# Patient Record
Sex: MALE | Race: WHITE | HISPANIC OR LATINO | Employment: FULL TIME | URBAN - METROPOLITAN AREA
[De-identification: names, ages, dates, MRNs, and addresses within clinical notes are randomized per-mention and may not be internally consistent; named-entity substitution may affect disease eponyms.]

---

## 2024-09-19 ENCOUNTER — LAB VISIT (OUTPATIENT)
Dept: LAB | Facility: HOSPITAL | Age: 19
End: 2024-09-19
Attending: FAMILY MEDICINE

## 2024-09-19 ENCOUNTER — OFFICE VISIT (OUTPATIENT)
Dept: FAMILY MEDICINE | Facility: CLINIC | Age: 19
End: 2024-09-19
Payer: COMMERCIAL

## 2024-09-19 VITALS
TEMPERATURE: 98 F | HEART RATE: 89 BPM | WEIGHT: 192.25 LBS | BODY MASS INDEX: 26.04 KG/M2 | DIASTOLIC BLOOD PRESSURE: 80 MMHG | OXYGEN SATURATION: 97 % | HEIGHT: 72 IN | SYSTOLIC BLOOD PRESSURE: 128 MMHG

## 2024-09-19 DIAGNOSIS — Z13.220 ENCOUNTER FOR LIPID SCREENING FOR CARDIOVASCULAR DISEASE: ICD-10-CM

## 2024-09-19 DIAGNOSIS — Z11.4 ENCOUNTER FOR SCREENING FOR HIV: ICD-10-CM

## 2024-09-19 DIAGNOSIS — Z13.6 ENCOUNTER FOR LIPID SCREENING FOR CARDIOVASCULAR DISEASE: ICD-10-CM

## 2024-09-19 DIAGNOSIS — Z11.59 NEED FOR HEPATITIS C SCREENING TEST: ICD-10-CM

## 2024-09-19 DIAGNOSIS — Z00.00 GENERAL MEDICAL EXAM: Primary | ICD-10-CM

## 2024-09-19 DIAGNOSIS — Z11.3 SCREENING EXAMINATION FOR STI: ICD-10-CM

## 2024-09-19 DIAGNOSIS — Z00.00 GENERAL MEDICAL EXAM: ICD-10-CM

## 2024-09-19 LAB
ALBUMIN SERPL BCP-MCNC: 4.6 G/DL (ref 3.5–5.2)
ALP SERPL-CCNC: 63 U/L (ref 55–135)
ALT SERPL W/O P-5'-P-CCNC: 21 U/L (ref 10–44)
ANION GAP SERPL CALC-SCNC: 8 MMOL/L (ref 8–16)
AST SERPL-CCNC: 25 U/L (ref 10–40)
BASOPHILS # BLD AUTO: 0.08 K/UL (ref 0–0.2)
BASOPHILS NFR BLD: 1.1 % (ref 0–1.9)
BILIRUB SERPL-MCNC: 1.1 MG/DL (ref 0.1–1)
BUN SERPL-MCNC: 10 MG/DL (ref 6–20)
CALCIUM SERPL-MCNC: 9.9 MG/DL (ref 8.7–10.5)
CHLORIDE SERPL-SCNC: 104 MMOL/L (ref 95–110)
CHOLEST SERPL-MCNC: 148 MG/DL (ref 120–199)
CHOLEST/HDLC SERPL: 3.9 {RATIO} (ref 2–5)
CO2 SERPL-SCNC: 25 MMOL/L (ref 23–29)
CREAT SERPL-MCNC: 0.9 MG/DL (ref 0.5–1.4)
DIFFERENTIAL METHOD BLD: ABNORMAL
EOSINOPHIL # BLD AUTO: 0.2 K/UL (ref 0–0.5)
EOSINOPHIL NFR BLD: 2.3 % (ref 0–8)
ERYTHROCYTE [DISTWIDTH] IN BLOOD BY AUTOMATED COUNT: 17.6 % (ref 11.5–14.5)
EST. GFR  (NO RACE VARIABLE): >60 ML/MIN/1.73 M^2
GLUCOSE SERPL-MCNC: 67 MG/DL (ref 70–110)
HCT VFR BLD AUTO: 38 % (ref 40–54)
HDLC SERPL-MCNC: 38 MG/DL (ref 40–75)
HDLC SERPL: 25.7 % (ref 20–50)
HGB BLD-MCNC: 11.8 G/DL (ref 14–18)
IMM GRANULOCYTES # BLD AUTO: 0.08 K/UL (ref 0–0.04)
IMM GRANULOCYTES NFR BLD AUTO: 1.1 % (ref 0–0.5)
LDLC SERPL CALC-MCNC: 81.8 MG/DL (ref 63–159)
LYMPHOCYTES # BLD AUTO: 2.7 K/UL (ref 1–4.8)
LYMPHOCYTES NFR BLD: 36.1 % (ref 18–48)
MCH RBC QN AUTO: 19.9 PG (ref 27–31)
MCHC RBC AUTO-ENTMCNC: 31.1 G/DL (ref 32–36)
MCV RBC AUTO: 64 FL (ref 82–98)
MONOCYTES # BLD AUTO: 0.7 K/UL (ref 0.3–1)
MONOCYTES NFR BLD: 9.3 % (ref 4–15)
NEUTROPHILS # BLD AUTO: 3.8 K/UL (ref 1.8–7.7)
NEUTROPHILS NFR BLD: 50.1 % (ref 38–73)
NONHDLC SERPL-MCNC: 110 MG/DL
NRBC BLD-RTO: 0 /100 WBC
PLATELET # BLD AUTO: 217 K/UL (ref 150–450)
PMV BLD AUTO: 11.5 FL (ref 9.2–12.9)
POTASSIUM SERPL-SCNC: 4.2 MMOL/L (ref 3.5–5.1)
PROT SERPL-MCNC: 8 G/DL (ref 6–8.4)
RBC # BLD AUTO: 5.94 M/UL (ref 4.6–6.2)
SODIUM SERPL-SCNC: 137 MMOL/L (ref 136–145)
TRIGL SERPL-MCNC: 141 MG/DL (ref 30–150)
WBC # BLD AUTO: 7.5 K/UL (ref 3.9–12.7)

## 2024-09-19 PROCEDURE — 86593 SYPHILIS TEST NON-TREP QUANT: CPT | Performed by: FAMILY MEDICINE

## 2024-09-19 PROCEDURE — 99385 PREV VISIT NEW AGE 18-39: CPT | Mod: S$PBB,,, | Performed by: FAMILY MEDICINE

## 2024-09-19 PROCEDURE — 80053 COMPREHEN METABOLIC PANEL: CPT | Performed by: FAMILY MEDICINE

## 2024-09-19 PROCEDURE — 99213 OFFICE O/P EST LOW 20 MIN: CPT | Mod: PBBFAC,PN | Performed by: FAMILY MEDICINE

## 2024-09-19 PROCEDURE — 86803 HEPATITIS C AB TEST: CPT | Performed by: FAMILY MEDICINE

## 2024-09-19 PROCEDURE — 99999 PR PBB SHADOW E&M-EST. PATIENT-LVL III: CPT | Mod: PBBFAC,,, | Performed by: FAMILY MEDICINE

## 2024-09-19 PROCEDURE — 36415 COLL VENOUS BLD VENIPUNCTURE: CPT | Mod: PN | Performed by: FAMILY MEDICINE

## 2024-09-19 PROCEDURE — 85025 COMPLETE CBC W/AUTO DIFF WBC: CPT | Performed by: FAMILY MEDICINE

## 2024-09-19 PROCEDURE — 80061 LIPID PANEL: CPT | Performed by: FAMILY MEDICINE

## 2024-09-19 PROCEDURE — 87389 HIV-1 AG W/HIV-1&-2 AB AG IA: CPT | Performed by: FAMILY MEDICINE

## 2024-09-20 LAB
HCV AB SERPL QL IA: NORMAL
HIV 1+2 AB+HIV1 P24 AG SERPL QL IA: NORMAL
TREPONEMA PALLIDUM IGG+IGM AB [PRESENCE] IN SERUM OR PLASMA BY IMMUNOASSAY: NONREACTIVE

## 2024-09-30 NOTE — PROGRESS NOTES
Patient Name: Reno Carrillo    : 2005  MRN: [id number]      Subjective:     Patient ID: Reno is a 19 y.o. male    Chief Complaint:  Annual Exam    19-year-old male presents to [facility] care and for annual health maintenance exam.         Review of Systems   Constitutional:  Negative for unexpected weight change.   HENT:  Negative for ear pain and sore throat.    Eyes:  Negative for visual disturbance.   Respiratory:  Negative for shortness of breath.    Cardiovascular:  Negative for chest pain.   Gastrointestinal:  Negative for abdominal pain and blood in stool.   Endocrine: Negative for cold intolerance and heat intolerance.   Genitourinary:  Negative for dysuria and frequency.   Integumentary:  Negative for rash.   Neurological:  Negative for weakness, numbness and headaches.   Hematological:  Negative for adenopathy.   Psychiatric/Behavioral:  Negative for suicidal ideas.         Objective:   /80 (BP Location: Right arm, Patient Position: Sitting, BP Method: Medium (Manual))   Pulse 89   Temp 97.8 °F (36.6 °C) (Oral)   Ht 6' (1.829 m)   Wt 87.2 kg (192 lb 3.9 oz)   SpO2 97%   BMI 26.07 kg/m²     Physical Exam  Vitals reviewed.   Constitutional:       General: He is not in acute distress.  HENT:      Head: Normocephalic and atraumatic.      Right Ear: Ear canal and external ear normal.      Left Ear: Ear canal and external ear normal.      Nose: Nose normal.      Mouth/Throat:      Mouth: Mucous membranes are moist.      Pharynx: No oropharyngeal exudate or posterior oropharyngeal erythema.   Eyes:      Extraocular Movements: Extraocular movements intact.      Conjunctiva/sclera: Conjunctivae normal.      Pupils: Pupils are equal, round, and reactive to light.   Cardiovascular:      Rate and Rhythm: Normal rate and regular rhythm.      Pulses: Normal pulses.      Heart sounds: Normal heart sounds.   Pulmonary:      Effort: Pulmonary effort is normal. No respiratory distress.      Breath  sounds: No wheezing or rales.   Abdominal:      General: Abdomen is flat. Bowel sounds are normal. There is no distension.      Palpations: Abdomen is soft.      Tenderness: There is no abdominal tenderness. There is no guarding.   Musculoskeletal:      Cervical back: Normal range of motion. No rigidity or tenderness.   Lymphadenopathy:      Cervical: No cervical adenopathy.   Skin:     General: Skin is warm.      Capillary Refill: Capillary refill takes less than 2 seconds.   Neurological:      Mental Status: He is alert and oriented to person, place, and time.      Cranial Nerves: No cranial nerve deficit.      Sensory: No sensory deficit.   Psychiatric:         Mood and Affect: Mood normal.         Behavior: Behavior normal.        Assessment        ICD-10-CM ICD-9-CM   1. General medical exam  Z00.00 V70.9   2. Encounter for lipid screening for cardiovascular disease  Z13.220 V77.91    Z13.6 V81.2   3. Encounter for screening for HIV  Z11.4 V73.89   4. Need for hepatitis C screening test  Z11.59 V73.89   5. Screening examination for STI  Z11.3 V74.5         Plan:     1. General medical exam  -     Comprehensive Metabolic Panel; Future; Expected date: 09/19/2024  -     CBC Auto Differential; Future; Expected date: 09/19/2024  -     Lipid Panel; Future; Expected date: 09/19/2024  -     HIV 1/2 Ag/Ab (4th Gen); Future; Expected date: 09/19/2024  -     Hepatitis C Antibody; Future; Expected date: 09/19/2024  -     C. trachomatis/N. gonorrhoeae by AMP DNA; Future; Expected date: 09/19/2024  -     Treponema Pallidium Antibodies IgG, IgM; Future; Expected date: 09/19/2024  Age appropriate screenings, vaccinations, and recommendations reviewed and discussed.  Appropriate orders placed and previous results reviewed.    2. Encounter for lipid screening for cardiovascular disease  -     Lipid Panel; Future; Expected date: 09/19/2024  Screening lipid panel ordered as per USPSTF guidelines.    3. Encounter for screening for  HIV  -     HIV 1/2 Ag/Ab (4th Gen); Future; Expected date: 09/19/2024  Screen for HIV as per USPSTF guidelines.    4. Need for hepatitis C screening test  -     Hepatitis C Antibody; Future; Expected date: 09/19/2024  Screen for HCV as per USPSTF guidelines.    5. Screening examination for STI  -     HIV 1/2 Ag/Ab (4th Gen); Future; Expected date: 09/19/2024  -     Hepatitis C Antibody; Future; Expected date: 09/19/2024  -     C. trachomatis/N. gonorrhoeae by AMP DNA; Future; Expected date: 09/19/2024  -     Treponema Pallidium Antibodies IgG, IgM; Future; Expected date: 09/19/2024  STI screening ordered         -Sebastien Faria Jr., MD, AAHIVS      This office note has been dictated.  This dictation has been generated using M-Modal Fluency Direct dictation; some phonetic errors may occur.         There are no Patient Instructions on file for this visit.      Follow up in about 1 year (around 9/19/2025) for Annual.   Future Appointments   Date Time Provider Department Center   9/19/2025 10:00 AM Sebastien Faria Jr., MD [facility]

## 2024-10-08 ENCOUNTER — TELEPHONE (OUTPATIENT)
Dept: FAMILY MEDICINE | Facility: CLINIC | Age: 19
End: 2024-10-08
Payer: COMMERCIAL

## 2024-10-08 NOTE — TELEPHONE ENCOUNTER
Appointment scheduled, letter sent out  ----- Message from Sebastien Faria MD sent at 10/6/2024  8:54 PM CDT -----  Please call patient with  schedule virtual visit with me so we can discuss his labs as they show anemia

## 2024-10-18 ENCOUNTER — LAB VISIT (OUTPATIENT)
Dept: LAB | Facility: HOSPITAL | Age: 19
End: 2024-10-18
Attending: FAMILY MEDICINE
Payer: COMMERCIAL

## 2024-10-18 ENCOUNTER — OFFICE VISIT (OUTPATIENT)
Dept: FAMILY MEDICINE | Facility: CLINIC | Age: 19
End: 2024-10-18
Payer: COMMERCIAL

## 2024-10-18 DIAGNOSIS — N32.81 OVERACTIVE BLADDER: ICD-10-CM

## 2024-10-18 DIAGNOSIS — R39.15 URINARY URGENCY: ICD-10-CM

## 2024-10-18 DIAGNOSIS — D50.9 MICROCYTIC ANEMIA: Primary | ICD-10-CM

## 2024-10-18 DIAGNOSIS — D50.9 MICROCYTIC ANEMIA: ICD-10-CM

## 2024-10-18 LAB
BASOPHILS # BLD AUTO: 0.07 K/UL (ref 0–0.2)
BASOPHILS NFR BLD: 1 % (ref 0–1.9)
DIFFERENTIAL METHOD BLD: ABNORMAL
EOSINOPHIL # BLD AUTO: 0.2 K/UL (ref 0–0.5)
EOSINOPHIL NFR BLD: 2.5 % (ref 0–8)
ERYTHROCYTE [DISTWIDTH] IN BLOOD BY AUTOMATED COUNT: 16.5 % (ref 11.5–14.5)
FERRITIN SERPL-MCNC: 193 NG/ML (ref 20–300)
HCT VFR BLD AUTO: 36.1 % (ref 40–54)
HGB BLD-MCNC: 11.4 G/DL (ref 14–18)
IMM GRANULOCYTES # BLD AUTO: 0.06 K/UL (ref 0–0.04)
IMM GRANULOCYTES NFR BLD AUTO: 0.8 % (ref 0–0.5)
IRON SERPL-MCNC: 113 UG/DL (ref 45–160)
LDH SERPL L TO P-CCNC: 146 U/L (ref 110–260)
LYMPHOCYTES # BLD AUTO: 2.6 K/UL (ref 1–4.8)
LYMPHOCYTES NFR BLD: 36.9 % (ref 18–48)
MCH RBC QN AUTO: 19.8 PG (ref 27–31)
MCHC RBC AUTO-ENTMCNC: 31.6 G/DL (ref 32–36)
MCV RBC AUTO: 63 FL (ref 82–98)
MONOCYTES # BLD AUTO: 0.6 K/UL (ref 0.3–1)
MONOCYTES NFR BLD: 8.9 % (ref 4–15)
NEUTROPHILS # BLD AUTO: 3.5 K/UL (ref 1.8–7.7)
NEUTROPHILS NFR BLD: 49.9 % (ref 38–73)
NRBC BLD-RTO: 0 /100 WBC
PLATELET # BLD AUTO: 223 K/UL (ref 150–450)
PMV BLD AUTO: 10.4 FL (ref 9.2–12.9)
RBC # BLD AUTO: 5.75 M/UL (ref 4.6–6.2)
RETICS/RBC NFR AUTO: 2.8 % (ref 0.4–2)
SATURATED IRON: 40 % (ref 20–50)
TOTAL IRON BINDING CAPACITY: 286 UG/DL (ref 250–450)
TRANSFERRIN SERPL-MCNC: 193 MG/DL (ref 200–375)
WBC # BLD AUTO: 7.07 K/UL (ref 3.9–12.7)

## 2024-10-18 PROCEDURE — 83615 LACTATE (LD) (LDH) ENZYME: CPT | Performed by: FAMILY MEDICINE

## 2024-10-18 PROCEDURE — 85025 COMPLETE CBC W/AUTO DIFF WBC: CPT | Performed by: FAMILY MEDICINE

## 2024-10-18 PROCEDURE — 1160F RVW MEDS BY RX/DR IN RCRD: CPT | Mod: CPTII,95,, | Performed by: FAMILY MEDICINE

## 2024-10-18 PROCEDURE — 99213 OFFICE O/P EST LOW 20 MIN: CPT | Mod: 95,,, | Performed by: FAMILY MEDICINE

## 2024-10-18 PROCEDURE — 84466 ASSAY OF TRANSFERRIN: CPT | Performed by: FAMILY MEDICINE

## 2024-10-18 PROCEDURE — 36415 COLL VENOUS BLD VENIPUNCTURE: CPT | Performed by: FAMILY MEDICINE

## 2024-10-18 PROCEDURE — 83021 HEMOGLOBIN CHROMOTOGRAPHY: CPT | Performed by: FAMILY MEDICINE

## 2024-10-18 PROCEDURE — 85045 AUTOMATED RETICULOCYTE COUNT: CPT | Performed by: FAMILY MEDICINE

## 2024-10-18 PROCEDURE — 1159F MED LIST DOCD IN RCRD: CPT | Mod: CPTII,95,, | Performed by: FAMILY MEDICINE

## 2024-10-18 PROCEDURE — 82728 ASSAY OF FERRITIN: CPT | Performed by: FAMILY MEDICINE

## 2024-10-18 PROCEDURE — 84153 ASSAY OF PSA TOTAL: CPT | Performed by: FAMILY MEDICINE

## 2024-10-18 NOTE — PROGRESS NOTES
Patient Name: Reno Carrillo    : 2005  MRN: [id number]    .  Subjective:     Patient ID: Reno is a 19 y.o. male    History of Present Illness              Review of Systems     Objective:   There were no vitals taken for this visit.    Physical Exam     Physical Exam              Assessment    No diagnosis found.      Plan:   Assessment & Plan                {There are no diagnoses linked to this encounter. (Refresh or delete this SmartLink)}         -Sebastien Faria Jr., MD, AAHIVS      This note was generated with the assistance of ambient listening technology. Verbal consent was obtained by the patient and accompanying visitor(s) for the recording of patient appointment to facilitate this note. I attest to having reviewed and edited the generated note for accuracy, though some syntax or spelling errors may persist. Please contact the author of this note for any clarification.      There are no Patient Instructions on file for this visit.      No follow-ups on file.   Future Appointments   Date Time Provider Department Center   2025 10:00 AM Sebastien Faria Jr., MD [facility]

## 2024-10-19 LAB — COMPLEXED PSA SERPL-MCNC: 0.55 NG/ML (ref 0–4)

## 2024-10-20 PROBLEM — D50.9 MICROCYTIC ANEMIA: Status: ACTIVE | Noted: 2024-10-20

## 2024-10-20 NOTE — PROGRESS NOTES
Patient Name: Reno Carrillo    : 2005  MRN: [id number]    The patient location is: [place], [place]  The chief complaint leading to consultation is: Review labs    Visit type: audiovisual    Face to Face time with patient: 9 minutes  11 minutes of total time spent on the encounter, which includes face to face time and non-face to face time preparing to see the patient (eg, review of tests), Obtaining and/or reviewing separately obtained history, Documenting clinical information in the electronic or other health record, Independently interpreting results (not separately reported) and communicating results to the patient/family/caregiver, or Care coordination (not separately reported).         Each patient to whom he or she provides medical services by telemedicine is:  (1) informed of the relationship between the physician and patient and the respective role of any other health care provider with respect to management of the patient; and (2) notified that he or she may decline to receive medical services by telemedicine and may withdraw from such care at any time.    Notes:     Subjective:     Patient ID: Reno is a 19 y.o. male    Chief Complaint:  Review abnormal labs    History of Present Illness    Reno presents today for follow up on abnormal labs showing anemia    He reports a history of anemia as a young person, which was treated and resolved. Recent lab results indicate small RBC size, with an MCV of 64 (normal range ). He mentions a family history of blood problems, specifically his paternal grandmother having a similar issue.    He reports urinary urgency with difficulty holding urine for approximately two years. He denies any previous consultation with a urologist regarding this issue.      ROS:  General: -fever, -chills, -fatigue, -weight gain, -weight loss  Eyes: -vision changes, -redness, -discharge  ENT: -ear pain, -nasal congestion, -sore throat  Cardiovascular: -chest pain,  -palpitations, -lower extremity edema  Respiratory: -cough, -shortness of breath  Gastrointestinal: -abdominal pain, -nausea, -vomiting, -diarrhea, -constipation, -blood in stool  Genitourinary: -dysuria, -hematuria, -frequency, +urgency  Musculoskeletal: -joint pain, -muscle pain  Skin: -rash, -lesion  Neurological: -headache, -dizziness, -numbness, -tingling  Psychiatric: -anxiety, -depression, -sleep difficulty         Objective:   There were no vitals taken for this visit.      Physical Exam  Pulmonary:      Effort: Pulmonary effort is normal.   Neurological:      Mental Status: He is alert.            Assessment        ICD-10-CM ICD-9-CM   1. Microcytic anemia  D50.9 280.9   2. Urinary urgency  R39.15 788.63   3. Overactive bladder  N32.81 596.51         Plan:     Assessment & Plan    IMPRESSION:  Evaluating patient's anemia with small RBC size (MCV 64)  Considering potential causes: iron deficiency vs genetic condition  Assessing family history of blood disorders  Investigating urinary urgency symptoms    MICROCYTIC ANEMIA:  Explained the significance of small RBC size in anemia.  Discussed potential causes of microcytic anemia: iron deficiency vs genetic condition.  Ordered labs to evaluate anemia.    URINARY TRACT CONCERN:  Added urine test to check for infection.  Will consider referral to urologist if urine test is normal.    FOLLOW UP:  Contact the office in about 1 week for test results.  Further instructions will be provided based on test results.          ORDERS  1. Microcytic anemia  -     CBC Auto Differential; Future; Expected date: 10/18/2024  -     Iron and TIBC; Future; Expected date: 10/18/2024  -     Ferritin; Future; Expected date: 10/18/2024  -     Hemoglobin Electrophoresis,Hgb A2 Christiano.; Future; Expected date: 10/18/2024  -     Reticulocytes; Future; Expected date: 10/18/2024  -     LACTATE DEHYDROGENASE; Future; Expected date: 10/18/2024    2. Urinary urgency  -     Urinalysis; Future;  Expected date: 10/18/2024  -     Urine culture; Future; Expected date: 10/18/2024  -     PROSTATE SPECIFIC ANTIGEN, DIAGNOSTIC; Future; Expected date: 10/18/2024    3. Overactive bladder  -     Urinalysis; Future; Expected date: 10/18/2024  -     Urine culture; Future; Expected date: 10/18/2024  -     PROSTATE SPECIFIC ANTIGEN, DIAGNOSTIC; Future; Expected date: 10/18/2024             -Sebastien Faria Jr., MD, AAHIVS      This note was generated with the assistance of ambient listening technology. Verbal consent was obtained by the patient and accompanying visitor(s) for the recording of patient appointment to facilitate this note. I attest to having reviewed and edited the generated note for accuracy, though some syntax or spelling errors may persist. Please contact the author of this note for any clarification.          There are no Patient Instructions on file for this visit.    Follow Up  No follow-ups on file.    Future Appointments   Date Time Provider Department Center   9/19/2025 10:00 AM Sebastien Faria Jr., MD [facility]

## 2024-10-21 LAB
HGB A2 MFR BLD HPLC: 5.1 % (ref 2.2–3.2)
HGB FRACT BLD ELPH-IMP: ABNORMAL
HGB FRACT BLD ELPH-IMP: ABNORMAL

## 2025-08-12 ENCOUNTER — TELEPHONE (OUTPATIENT)
Dept: FAMILY MEDICINE | Facility: CLINIC | Age: 20
End: 2025-08-12
Payer: COMMERCIAL

## 2025-08-12 ENCOUNTER — NURSE TRIAGE (OUTPATIENT)
Dept: ADMINISTRATIVE | Facility: CLINIC | Age: 20
End: 2025-08-12
Payer: COMMERCIAL

## 2025-08-13 ENCOUNTER — OFFICE VISIT (OUTPATIENT)
Dept: FAMILY MEDICINE | Facility: CLINIC | Age: 20
End: 2025-08-13
Payer: COMMERCIAL

## 2025-08-13 VITALS
OXYGEN SATURATION: 98 % | RESPIRATION RATE: 18 BRPM | DIASTOLIC BLOOD PRESSURE: 88 MMHG | WEIGHT: 190 LBS | HEIGHT: 72 IN | SYSTOLIC BLOOD PRESSURE: 118 MMHG | HEART RATE: 74 BPM | BODY MASS INDEX: 25.73 KG/M2

## 2025-08-13 DIAGNOSIS — R21 GENERALIZED RASH: Primary | ICD-10-CM

## 2025-08-13 PROCEDURE — 99999 PR PBB SHADOW E&M-EST. PATIENT-LVL III: CPT | Mod: PBBFAC,,,

## 2025-08-13 RX ORDER — HYDROXYZINE HYDROCHLORIDE 25 MG/1
25 TABLET, FILM COATED ORAL 3 TIMES DAILY PRN
Qty: 30 TABLET | Refills: 0 | Status: SHIPPED | OUTPATIENT
Start: 2025-08-13